# Patient Record
(demographics unavailable — no encounter records)

---

## 2024-12-12 NOTE — HISTORY OF PRESENT ILLNESS
[FreeTextEntry1] :   Pediatric & Adolescent Gynecology: New Patient Visit   DONNA is a(n) 16 year old female ( 2008) presenting for abdominal pain   Referred by: none PCP: none   Review of history from records: [facility] [place] ED 10/27- c/o b/l lower abdominal/pelvic pain Lab results from ED: Chlamydia Amplification- detected -ER visit , completed 10 out of 14 day of doxycycline monohydrate (stopped taking because sxs relieved) -mom does NOT know about positive STI, aware Donna is sexually active  10/27 US of abdomen and pelvis- WNL  CT of abdomen and pelvis suggestive of colitis   Today 12/10/2024: pt is here with     Menstrual history:   LMP Prior periods:   Menarche- Cycles (regular/irregular): Duration of periods (days): Flow: Period products: Cramps: -onset of cramps (before or first day) - takes Symptoms: -lightheaded, dizziness, SOB, lethargic, nausea, headaches -Acne and body hair?     Family history:    Bleeding history: - No personal history of frequent/prolonged nosebleeds, easy bruising, excess bleeding with injury. - Has never had surgery. * Had surgery without bleeding issues - No known family history of bleeding disorders. Estrogen contraindications: - No personal history of DVT/PE/clotting disorder, migraines with aura; HTN, DM, dyslipidemia; cardiovascular, renal, liver, or inflammatory bowel disease; SLE with aPLA; malignancy. - No close family history of DVT/PE/clotting disorder.    Social / Confidential history: obtained 12/10/2024 School: * grade - after HS: Activities/Hobbies: - Lives with: - feels safe - no h/o abuse Mood: generally good - no h/o depression/anxiety Dating/Relationships: - current: - past: Interested in [boys/girls/both/not sure]: - Sexual activity: - pre-sexual activity: Gender/Pronouns:

## 2024-12-12 NOTE — HISTORY OF PRESENT ILLNESS
[FreeTextEntry1] :   Pediatric & Adolescent Gynecology: New Patient Visit   DONNA is a(n) 16 year old female ( 2008) presenting for abdominal pain   Referred by: none PCP: none   [place] [facility] [place]/o b/l lower abdominal/pelvic pain Lab results from ED: Chlamydia Amplification- detected -ER visit , completed 10 out of 14 day of doxycycline monohydrate (stopped taking because sxs relieved) -mom does NOT know about positive STI, aware Donna is sexually active  10/27 US of abdomen and pelvis- WNL  CT of abdomen and pelvis suggestive of colitis   Today 12/10/2024: pt is here with     Menstrual history:   LMP Prior periods:   Menarche- Cycles (regular/irregular): Duration of periods (days): Flow: Period products: Cramps: -onset of cramps (before or first day) - takes Symptoms: -lightheaded, dizziness, SOB, lethargic, nausea, headaches -Acne and body hair?     Family history:    Bleeding history: - No personal history of frequent/prolonged nosebleeds, easy bruising, excess bleeding with injury. - Has never had surgery. * Had surgery without bleeding issues - No known family history of bleeding disorders. Estrogen contraindications: - No personal history of DVT/PE/clotting disorder, migraines with aura; HTN, DM, dyslipidemia; cardiovascular, renal, liver, or inflammatory bowel disease; SLE with aPLA; malignancy. - No close family history of DVT/PE/clotting disorder.    Social / Confidential history: obtained 12/10/2024 School: * grade - after HS: Activities/Hobbies: - Lives with: - feels safe - no h/o abuse Mood: generally good - no h/o depression/anxiety Dating/Relationships: - current: - past: Interested in [boys/girls/both/not sure]: - Sexual activity: - pre-sexual activity: Gender/Pronouns: